# Patient Record
Sex: MALE | Race: WHITE | NOT HISPANIC OR LATINO | Employment: FULL TIME | URBAN - METROPOLITAN AREA
[De-identification: names, ages, dates, MRNs, and addresses within clinical notes are randomized per-mention and may not be internally consistent; named-entity substitution may affect disease eponyms.]

---

## 2017-04-07 ENCOUNTER — ALLSCRIPTS OFFICE VISIT (OUTPATIENT)
Dept: OTHER | Facility: OTHER | Age: 27
End: 2017-04-07

## 2017-09-12 ENCOUNTER — ALLSCRIPTS OFFICE VISIT (OUTPATIENT)
Dept: OTHER | Facility: OTHER | Age: 27
End: 2017-09-12

## 2017-09-12 LAB — S PYO AG THROAT QL: NEGATIVE

## 2018-01-09 NOTE — MISCELLANEOUS
Message  Return to work or school:   Lyn Ribeiro is under my professional care   He was seen in my office on 1/26/16   He is able to return to work on  1/27/16            Signatures   Electronically signed by : KALI Torres ; Jan 26 2016  4:14PM EST                       (Author)

## 2018-01-10 NOTE — PROGRESS NOTES
Assessment    1  Well adult exam (V70 0) (Z00 00)   2  Attention and concentration deficit (799 51) (R41 840)   3  Asthma (493 90) (J45 909)   4  Folliculitis (398 0) (T91 7)   5  Congenital nystagmus (379 51) (H55 01)   6  Obesity (278 00) (E66 9)   7  Need for Tdap vaccination (V06 1) (Z23)    Plan  Asthma    · Ventolin  (90 Base) MCG/ACT Inhalation Aerosol Solution; INHALE 2  PUFFS BY MOUTH EVERY 4 HOURS AS NEEDED  Attention and concentration deficit    · *1 - [facility]  *  Status: Active   Requested for: 07Apr2017  Health Referral Questions : Patient requires Psychiatry assessment/intake      appointment (with MD/DO)  Care Summary provided  : Yes  Need for Tdap vaccination    · Adacel 5-2-15 5 LF-MCG/0 5 Intramuscular Suspension  Well adult exam    · Call [phone] if: You have any warning signs of skin cancer ; Status:Complete;    Done: 68BRE1967   · Always use a seat belt and shoulder strap when riding or driving a motor vehicle ;  Status:Complete;   Done: 50XIU1826   · Begin or continue regular aerobic exercise   Gradually work up to at least 3 sessions of 30  minutes of exercise a week ; Status:Complete;   Done: 11NMZ7093   · Brush your teeth 3 times a day and floss at least once a day ; Status:Complete;   Done:  38QWN3265   · Decreasing the stress in your life may help your condition improve ; Status:Complete;    Done: 07Apr2017   · Eat a normal well-balanced diet ; Status:Complete;   Done: 98AJL3773   · Limit your use of alcohol to 2 drinks or cans of beer a day ; Status:Complete;   Done:  11RFP3501   · There are many ways to reduce your risk of catching or spreading a sexually transmitted  Infection ; Status:Complete;   Done: 19BTG7604   · Use a sun block product with an SPF of 15 or more ; Status:Complete;   Done:  07Apr2017   · We recommend routine visits to a dentist ; Status:Complete;   Done: 17TKU4989   · We recommend that you bring your body mass index down to 26 ; Status:Complete;    Done: 07Apr2017   · We recommend that you follow the "Mediterranean diet "; Status:Complete;   Done:  07Apr2017   · We recommend that you follow these rules for gun safety ; Status:Complete;   Done:  07Apr2017    Discussion/Summary  Impression: health maintenance visit  Currently, he eats an adequate diet and has an adequate exercise regimen  Prostate cancer screening: PSA is not indicated  Testicular cancer screening: self testicular exam technique was taught, monthly self testicular exam was advised and clinical testicular exam was done today  Colorectal cancer screening: colorectal cancer screening is not indicated  The immunizations are needed  Advice and education were given regarding nutrition, aerobic exercise and weight loss  Preventative + obesity: Continued weight loss measures (diet + regular exercise) encouraged  Short term goal is to get weight below 200 lbs  Tdap given  UTD with eye exam (congenital nystagmus; sees specialist in [place]; no recent changes; OK to drive)  Work PE form completed  Asthma (mild, intermittent): Refill given for rescue inhaler  Call for worsening  Attention/concentration deficit: Psych referral, per patient request, for further evaluation + discussion of treatment options  Pseudofolliculitis barbae: Handout given--discussion of various treatment measures  Avoid over shaving  Derm for worsening  RTO 1 year     Chief Complaint  26 year well      History of Present Illness  HM, Adult Male: The patient is being seen for a health maintenance evaluation  The last health maintenance visit was 2 year(s) ago  Social History: He is unmarried  Work status: working full time  The patient has never smoked cigarettes  He reports occasional alcohol use and drinking 1-2 drinks per month  The patient has no concerns about alcohol abuse  He has never used illicit drugs  General Health:  The patient's health since the last visit is described as good  He has regular dental visits  He complains of vision problems  Vision care includes having eye examinations 1 times per year  He denies hearing loss  Lifestyle:  He consumes a diverse and healthy diet  He has weight concerns  He exercises regularly  He does not use tobacco  He consumes alcohol  He reports occasional alcohol use  He typically drinks wine  Alcohol concern: The patient has no concerns about alcohol abuse  He denies drug use  Reproductive health:  the patient is sexually active  birth control is being practiced  Screening: cancer screening reviewed and updated  metabolic screening reviewed and updated  risk screening reviewed and updated  HPI:   Here for routine well exam      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    No acute complaints or issues  Congenital nystagmus  No recent change in vision  No corrective lenses  Sees specialist in [place]  Cleared to drive day/night  No recent flares of asthma  Uses rescue inhaler a couple times week at the most, usually when he's in the city  Denies smoking  No recent allergy symptoms  Erratic work schedule  Problems with focus/concentration as a result  Asking about medication options  Good sleep quality when he gets adequate hours  No apnea  Decent energy level overall  Nontender bumps on back of neck x 1-2 years  Attributes to shaving  Non-changing pink birthmark right side of scalp  No recent changes  Saw dermatologist for this a couple years ago  No interventions needed  Trying to lose weight by eating healthier, more fruits, vegetables  Running, doing weights when he can  Steady girlfriend  Sexually active  Uses protection  No STI's  Occasional social alcohol  No drug use  Tdap 2008  Review of Systems    Constitutional: no fever and not feeling tired  Eyes: as noted in HPI    ENT: no sore throat and no hearing loss     Cardiovascular: no chest pain, no palpitations and no extremity edema  Respiratory: no shortness of breath and no cough  Gastrointestinal: no abdominal pain, no nausea, no vomiting, no constipation, no diarrhea and no blood in stools  Genitourinary: no dysuria  Musculoskeletal: no arthralgias  Integumentary: as noted in HPI  Neurological: no headache and no dizziness  Psychiatric: as noted in HPI and no depression  Endocrine: no muscle weakness  Hematologic/Lymphatic: no swollen glands  Over the past 2 weeks, how often have you been bothered by the following problems? 1 ) Little interest or pleasure in doing things? Not at all    2 ) Feeling down, depressed or hopeless? Not at all  Active Problems    1  Asthma (493 90) (J45 909)   2  Congenital nystagmus (379 51) (H55 01)   3  Folliculitis (405 8) (O89 0)   4  Lipid screening (V77 91) (Z13 220)   5  Obesity (278 00) (E66 9)    Past Medical History    · Attention and concentration deficit (799 51) (R41 840)   · History of allergic rhinitis (V12 69) (Z87 09)   · History of insomnia (V13 89) (D67 623)   · Need for Tdap vaccination (V06 1) (Z23)   · History of Pain, upper back (724 5) (M54 9)   · Well adult exam (V70 0) (Z00 00)    Family History  Mother    · No pertinent family history  Father    · No pertinent family history    Social History    · Denied: History of Alcohol   · Denied: History of Drug Use   · Educational Level - Has High School Diploma   · Marital History - Single   · Never A Smoker    Current Meds   1  Mucinex DM  MG Oral Tablet Extended Release 12 Hour; BY MOUTH EVERY 12   HOURS AS NEEDED; Therapy: 71YUW7567 to (Last Rx:26Jan2016)  Requested for: 26Jan2016 Ordered   2  TraZODone HCl - 50 MG Oral Tablet; TAKE 1 TABLET AT BEDTIME AS NEEDED FOR   SLEEP; Therapy: 92CUE8647 to (Evaluate:67Wle5057)  Requested for: 82RCZ2192; Last   Rx:18Nov2014 Ordered   3   Ventolin  (90 Base) MCG/ACT Inhalation Aerosol Solution; INHALE 2 PUFFS BY   MOUTH EVERY 4 HOURS AS NEEDED; Therapy: 47LPE5040 to (Last Rx:13Jpv9572)  Requested for: 26Ioe5838 Ordered    Allergies    1  No Known Drug Allergies    Vitals   Recorded: 86KBA9312 10:52BK   Systolic 791   Diastolic 84     Physical Exam    Constitutional   General appearance: No acute distress, well appearing and well nourished  Head and Face   Head and face: Normal     Eyes   Conjunctiva and lids: No erythema, swelling or discharge  Pupils and irises: Equal, round, reactive to light  Ophthalmoscopic examination: Normal fundi and optic discs  Ears, Nose, Mouth, and Throat   External inspection of ears and nose: Normal     Otoscopic examination: Tympanic membranes translucent with normal light reflex  Canals patent without erythema  Nasal mucosa, septum, and turbinates: Normal without edema or erythema  Lips, teeth, and gums: Normal, good dentition  Oropharynx: Normal with no erythema, edema, exudate or lesions  Neck   Neck: Supple, symmetric, trachea midline, no masses  Thyroid: Normal, no thyromegaly  Pulmonary   Respiratory effort: No increased work of breathing or signs of respiratory distress  Auscultation of lungs: Clear to auscultation  Cardiovascular   Auscultation of heart: Normal rate and rhythm, normal S1 and S2, no murmurs  Pedal pulses: 2+ bilaterally  Examination of extremities for edema and/or varicosities: Normal     Abdomen   Abdomen: Non-tender, no masses  Liver and spleen: No hepatomegaly or splenomegaly  Genitourinary   Scrotal contents: Normal testes, no masses  Penis: Normal, no lesions  Lymphatic   Palpation of lymph nodes in neck: No lymphadenopathy  Musculoskeletal   Gait and station: Normal     Muscle strength/tone: Normal     Skin   Skin and subcutaneous tissue: Abnormal   Back of neck, just below hairline, with couple, small (<5mm), nontender, skin colored papules  Pink, bumpy, raised, plaque (birthmark) on right frontal scalp  Neurologic   Reflexes: 2+ and symmetric  Psychiatric   Orientation to person, place and time: Normal     Mood and affect: Normal        Signatures   Electronically signed by : Segundo Ricks; Apr 7 2017  9:58AM EST                       (Author)    Electronically signed by : Ruba Middleton DO;  Apr 7 2017 10:53AM EST                       (Author)

## 2018-01-13 VITALS — DIASTOLIC BLOOD PRESSURE: 84 MMHG | SYSTOLIC BLOOD PRESSURE: 126 MMHG

## 2018-01-13 VITALS
SYSTOLIC BLOOD PRESSURE: 132 MMHG | TEMPERATURE: 99.2 F | HEART RATE: 89 BPM | OXYGEN SATURATION: 98 % | DIASTOLIC BLOOD PRESSURE: 72 MMHG | WEIGHT: 224 LBS

## 2018-03-28 ENCOUNTER — HOSPITAL ENCOUNTER (EMERGENCY)
Facility: HOSPITAL | Age: 28
Discharge: HOME | End: 2018-03-28
Admitting: EMERGENCY MEDICINE
Payer: COMMERCIAL

## 2018-03-28 ENCOUNTER — APPOINTMENT (EMERGENCY)
Dept: RADIOLOGY | Facility: HOSPITAL | Age: 28
End: 2018-03-28
Payer: COMMERCIAL

## 2018-03-28 VITALS
WEIGHT: 236 LBS | BODY MASS INDEX: 35.77 KG/M2 | HEIGHT: 68 IN | SYSTOLIC BLOOD PRESSURE: 129 MMHG | HEART RATE: 90 BPM | DIASTOLIC BLOOD PRESSURE: 61 MMHG | TEMPERATURE: 97.7 F | OXYGEN SATURATION: 98 % | RESPIRATION RATE: 18 BRPM
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
## 2018-03-28 DIAGNOSIS — S46.919A SHOULDER STRAIN: ICD-10-CM

## 2018-03-28 PROCEDURE — 73030 X-RAY EXAM OF SHOULDER: CPT

## 2018-03-28 PROCEDURE — 73560 X-RAY EXAM OF KNEE 1 OR 2: CPT

## 2018-03-28 PROCEDURE — 99284 EMERGENCY DEPT VISIT MOD MDM: CPT

## 2018-03-28 RX ORDER — NAPROXEN 500 MG/1
500 TABLET ORAL 2 TIMES DAILY WITH MEALS
Qty: 30 TABLET | Refills: 0 | Status: SHIPPED | OUTPATIENT
Start: 2018-03-28 | End: 2018-11-26

## 2018-03-28 RX ORDER — ACETAMINOPHEN 325 MG/1
975 TABLET ORAL ONCE
Status: COMPLETED | OUTPATIENT
Start: 2018-03-28 | End: 2018-03-28

## 2018-03-28 RX ORDER — IBUPROFEN 400 MG/1
800 TABLET ORAL ONCE
Status: COMPLETED | OUTPATIENT
Start: 2018-03-28 | End: 2018-03-28

## 2018-03-28 RX ADMIN — IBUPROFEN 800 MG: 400 TABLET, FILM COATED ORAL at 14:34

## 2018-03-28 RX ADMIN — ACETAMINOPHEN 975 MG: 325 TABLET, FILM COATED ORAL at 14:34
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
## 2018-03-28 NOTE — DISCHARGE INSTRUCTIONS
Muscle Strain   WHAT YOU NEED TO KNOW:   A muscle strain is a twist, pull, or tear of a muscle or tendon  A tendon is a strong elastic tissue that connects a muscle to a bone  Signs of a strained muscle include bruising and swelling over the area, pain with movement, and loss of strength  DISCHARGE INSTRUCTIONS:   Return to the emergency department if:   · You suddenly cannot feel or move your injured muscle  Contact your healthcare provider if:   · Your pain and swelling worsen or do not go away  · You have questions or concerns about your condition or care  Medicines:   · NSAIDs  help decrease swelling and pain or fever  This medicine is available with or without a doctor's order  NSAIDs can cause stomach bleeding or kidney problems in certain people  If you take blood thinner medicine, always ask your healthcare provider if NSAIDs are safe for you  Always read the medicine label and follow directions  · Muscle relaxers  help decrease pain and muscle spasms  · Take your medicine as directed  Contact your healthcare provider if you think your medicine is not helping or if you have side effects  Tell him of her if you are allergic to any medicine  Keep a list of the medicines, vitamins, and herbs you take  Include the amounts, and when and why you take them  Bring the list or the pill bottles to follow-up visits  Carry your medicine list with you in case of an emergency  Follow up with your healthcare provider as directed: Your healthcare provider may suggest that you have a follow-up visit before you go back to your usual activity  Write down your questions so you remember to ask them during your visits  Self-care:   · 3 to 7 days after the injury:  Use Rest, Ice, Compression, and Elevation (RICE) to help stop bruising and decrease pain and swelling  ¨ Rest:  Rest your muscle to allow your injury to heal  When the pain decreases, begin normal, slow movements   For mild and moderate muscle strains, you should rest your muscles for about 2 days  However, if you have a severe muscle strain, you should rest for 10 to 14 days  You may need to use crutches to walk if your muscle strain is in your legs or lower body  ¨ Ice:  Put an ice pack on the injured area  Put a towel between the ice pack and your skin  Do not put the ice pack directly on your skin  You can use a package of frozen peas instead of an ice pack  ¨ Compression:  You may need to wrap an elastic bandage around the area to decrease swelling  It should be tight enough for you to feel support  Do not wrap it too tightly  ¨ Elevation:  Keep the injured muscle raised above your heart if possible  For example if you have a strain of your lower leg muscle, lie down and prop your leg up on pillows  This helps decrease pain and swelling  · 3 to 21 days after the injury:  Start to slowly and regularly exercise your muscle  This will help it heal  If you feel pain, decrease how hard you are exercising  · 1 to 6 weeks after the injury:  Stretch the injured muscle  Hold the stretch for about 30 seconds  Do this 4 times a day  You may stretch the muscle until you feel a slight pull  Stop stretching if you feel pain  · 2 weeks to 6 months after the injury:  The goal of this phase is to return to the activity you were doing before the injury happened, without hurting the muscle again  · 3 weeks to 6 months after the injury:  Keep stretching and strengthening your muscles to avoid injury  Slowly increase the time and distance that you exercise  You may have signs and symptoms of muscle strain 6 months after the injury, even if you do things to help it heal  In this case, you may need surgery on the muscle  © 2017 [employer] Information is for End User's use only and may not be sold, redistributed or otherwise used for commercial purposes   All illustrations and images included in CareNotes® are the copyrighted property of [employer]  or Beni Mcmanus  The above information is an  only  It is not intended as medical advice for individual conditions or treatments  Talk to your doctor, nurse or pharmacist before following any medical regimen to see if it is safe and effective for you

## 2018-04-05 ENCOUNTER — TELEPHONE (OUTPATIENT)
Dept: FAMILY MEDICINE CLINIC | Facility: OTHER | Age: 28
End: 2018-04-05

## 2018-04-12 ENCOUNTER — TELEPHONE (OUTPATIENT)
Dept: FAMILY MEDICINE CLINIC | Facility: OTHER | Age: 28
End: 2018-04-12

## 2018-04-12 DIAGNOSIS — M79.603 PAIN OF UPPER EXTREMITY, UNSPECIFIED LATERALITY: Primary | ICD-10-CM

## 2018-04-12 NOTE — TELEPHONE ENCOUNTER
According to the ER report, he had pain in his SHOULDER and they performed a shoulder x-ray which was negative  No mention of anything with the FOREARM  If he is having ongoing issues, he should see an orthopedic specialist (order placed)  If they can't see him right away, and/or his arm gets worse, he should be seen either by us or a repeat visit to urgent care/ER    Thanks

## 2018-11-26 ENCOUNTER — OFFICE VISIT (OUTPATIENT)
Dept: FAMILY MEDICINE CLINIC | Facility: OTHER | Age: 28
End: 2018-11-26
Payer: COMMERCIAL

## 2018-11-26 VITALS
SYSTOLIC BLOOD PRESSURE: 124 MMHG | DIASTOLIC BLOOD PRESSURE: 84 MMHG | BODY MASS INDEX: 35.3 KG/M2 | HEART RATE: 99 BPM | TEMPERATURE: 97 F | WEIGHT: 224.9 LBS | HEIGHT: 67 IN | OXYGEN SATURATION: 98 %

## 2018-11-26 DIAGNOSIS — Z13.1 SCREENING FOR DIABETES MELLITUS: ICD-10-CM

## 2018-11-26 DIAGNOSIS — Z23 NEED FOR INFLUENZA VACCINATION: ICD-10-CM

## 2018-11-26 DIAGNOSIS — J45.20 MILD INTERMITTENT ASTHMA WITHOUT COMPLICATION: ICD-10-CM

## 2018-11-26 DIAGNOSIS — R41.840 ATTENTION AND CONCENTRATION DEFICIT: ICD-10-CM

## 2018-11-26 DIAGNOSIS — Z00.00 WELL ADULT EXAM: Primary | ICD-10-CM

## 2018-11-26 DIAGNOSIS — Z13.220 SCREENING FOR HYPERLIPIDEMIA: ICD-10-CM

## 2018-11-26 DIAGNOSIS — E66.9 CLASS 2 OBESITY WITHOUT SERIOUS COMORBIDITY WITH BODY MASS INDEX (BMI) OF 35.0 TO 35.9 IN ADULT, UNSPECIFIED OBESITY TYPE: ICD-10-CM

## 2018-11-26 PROCEDURE — 99395 PREV VISIT EST AGE 18-39: CPT | Performed by: NURSE PRACTITIONER

## 2018-11-26 PROCEDURE — 90471 IMMUNIZATION ADMIN: CPT

## 2018-11-26 PROCEDURE — 90686 IIV4 VACC NO PRSV 0.5 ML IM: CPT

## 2018-11-26 RX ORDER — ALBUTEROL SULFATE 90 UG/1
2 AEROSOL, METERED RESPIRATORY (INHALATION) EVERY 4 HOURS PRN
COMMUNITY
Start: 2013-05-06 | End: 2018-11-26 | Stop reason: SDUPTHER

## 2018-11-26 RX ORDER — ALBUTEROL SULFATE 90 UG/1
2 AEROSOL, METERED RESPIRATORY (INHALATION) EVERY 4 HOURS PRN
Qty: 2 INHALER | Refills: 3 | Status: SHIPPED | OUTPATIENT
Start: 2018-11-26 | End: 2019-03-18 | Stop reason: SDUPTHER

## 2018-11-26 NOTE — PATIENT INSTRUCTIONS
Wellness Visit for Adults   WHAT YOU NEED TO KNOW:   What is a wellness visit? A wellness visit is when you see your healthcare provider to get screened for health problems  You can also get advice on how to stay healthy  Write down your questions so you remember to ask them  Ask your healthcare provider how often you should have a wellness visit  What happens at a wellness visit? Your healthcare provider will ask about your health, and your family history of health problems  This includes high blood pressure, heart disease, and cancer  He or she will ask if you have symptoms that concern you, if you smoke, and about your mood  You may also be asked about your intake of medicines, supplements, food, and alcohol  Any of the following may be done:  · Your weight  will be checked  Your height may also be checked so your body mass index (BMI) can be calculated  Your BMI shows if you are at a healthy weight  · Your blood pressure  and heart rate will be checked  Your temperature may also be checked  · Blood and urine tests  may be done  Blood tests may be done to check your cholesterol levels  Abnormal cholesterol levels increase your risk for heart disease and stroke  You may also need a blood or urine test to check for diabetes if you are at increased risk  Urine tests may be done to look for signs of an infection or kidney disease  · A physical exam  includes checking your heartbeat and lungs with a stethoscope  Your healthcare provider may also check your skin to look for sun damage  · Screening tests  may be recommended  A screening test is done to check for diseases that may not cause symptoms  The screening tests you may need depend on your age, gender, family history, and lifestyle habits  For example, colorectal screening may be recommended if you are 48years old or older  What screening tests do I need if I am a woman? · A Pap smear  is used to screen for cervical cancer   Pap smears are usually done every 3 to 5 years depending on your age  You may need them more often if you have had abnormal Pap smear test results in the past  Ask your healthcare provider how often you should have a Pap smear  · A mammogram  is an x-ray of your breasts to screen for breast cancer  Experts recommend mammograms every 2 years starting at age 48 years  You may need a mammogram at age 52 years or younger if you have an increased risk for breast cancer  Talk to your healthcare provider about when you should start having mammograms and how often you need them  What vaccines might I need? · Get an influenza vaccine  every year  The influenza vaccine protects you from the flu  Several types of viruses cause the flu  The viruses change over time, so new vaccines are made each year  · Get a tetanus-diphtheria (Td) booster vaccine  every 10 years  This vaccine protects you against tetanus and diphtheria  Tetanus is a severe infection that may cause painful muscle spasms and lockjaw  Diphtheria is a severe bacterial infection that causes a thick covering in the back of your mouth and throat  · Get a human papillomavirus (HPV) vaccine  if you are female and aged 23 to 32 or male 23 to 24 and never received it  This vaccine protects you from HPV infection  HPV is the most common infection spread by sexual contact  HPV may also cause vaginal, penile, and anal cancers  · Get a pneumococcal vaccine  if you are aged 72 years or older  The pneumococcal vaccine is an injection given to protect you from pneumococcal disease  Pneumococcal disease is an infection caused by pneumococcal bacteria  The infection may cause pneumonia, meningitis, or an ear infection  · Get a shingles vaccine  if you are aged 61 or older, even if you have had shingles before  The shingles vaccine is an injection to protect you from the varicella-zoster virus  This is the same virus that causes chickenpox   Shingles is a painful rash that develops in people who had chickenpox or have been exposed to the virus  How can I eat healthy? My Plate is a model for planning healthy meals  It shows the types and amounts of foods that should go on your plate  Fruits and vegetables make up about half of your plate, and grains and protein make up the other half  A serving of dairy is included on the side of your plate  The amount of calories and serving sizes you need depends on your age, gender, weight, and height  Examples of healthy foods are listed below:  · Eat a variety of vegetables  such as dark green, red, and orange vegetables  You can also include canned vegetables low in sodium (salt) and frozen vegetables without added butter or sauces  · Eat a variety of fresh fruits , canned fruit in 100% juice, frozen fruit, and dried fruit  · Include whole grains  At least half of the grains you eat should be whole grains  Examples include whole-wheat bread, wheat pasta, brown rice, and whole-grain cereals such as oatmeal     · Eat a variety of protein foods such as seafood (fish and shellfish), lean meat, and poultry without skin (turkey and chicken)  Examples of lean meats include pork leg, shoulder, or tenderloin, and beef round, sirloin, tenderloin, and extra lean ground beef  Other protein foods include eggs and egg substitutes, beans, peas, soy products, nuts, and seeds  · Choose low-fat dairy products such as skim or 1% milk or low-fat yogurt, cheese, and cottage cheese  · Limit unhealthy fats  such as butter, hard margarine, and shortening  How much exercise do I need? Exercise at least 30 minutes per day on most days of the week  Some examples of exercise include walking, biking, dancing, and swimming  You can also fit in more physical activity by taking the stairs instead of the elevator or parking farther away from stores  Include muscle strengthening activities 2 days each week  Regular exercise provides many health benefits  It helps you manage your weight, and decreases your risk for type 2 diabetes, heart disease, stroke, and high blood pressure  Exercise can also help improve your mood  Ask your healthcare provider about the best exercise plan for you  What are some general health and safety guidelines I should follow? · Do not smoke  Nicotine and other chemicals in cigarettes and cigars can cause lung damage  Ask your healthcare provider for information if you currently smoke and need help to quit  E-cigarettes or smokeless tobacco still contain nicotine  Talk to your healthcare provider before you use these products  · Limit alcohol  A drink of alcohol is 12 ounces of beer, 5 ounces of wine, or 1½ ounces of liquor  · Lose weight, if needed  Being overweight increases your risk of certain health conditions  These include heart disease, high blood pressure, type 2 diabetes, and certain types of cancer  · Protect your skin  Do not sunbathe or use tanning beds  Use sunscreen with a SPF 15 or higher  Apply sunscreen at least 15 minutes before you go outside  Reapply sunscreen every 2 hours  Wear protective clothing, hats, and sunglasses when you are outside  · Drive safely  Always wear your seatbelt  Make sure everyone in your car wears a seatbelt  A seatbelt can save your life if you are in an accident  Do not use your cell phone when you are driving  This could distract you and cause an accident  Pull over if you need to make a call or send a text message  · Practice safe sex  Use latex condoms if are sexually active and have more than one partner  Your healthcare provider may recommend screening tests for sexually transmitted infections (STIs)  · Wear helmets, lifejackets, and protective gear  Always wear a helmet when you ride a bike or motorcycle, go skiing, or play sports that could cause a head injury  Wear protective equipment when you play sports   Wear a lifejacket when you are on a boat or doing water sports  CARE AGREEMENT:   You have the right to help plan your care  Learn about your health condition and how it may be treated  Discuss treatment options with your caregivers to decide what care you want to receive  You always have the right to refuse treatment  The above information is an  only  It is not intended as medical advice for individual conditions or treatments  Talk to your doctor, nurse or pharmacist before following any medical regimen to see if it is safe and effective for you  © 2017 [address] [employer] Information is for End User's use only and may not be sold, redistributed or otherwise used for commercial purposes  All illustrations and images included in CareNotes® are the copyrighted property of A D A M [employer] Inc  or Beni Mcmanus
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
## 2018-12-04 ENCOUNTER — TELEPHONE (OUTPATIENT)
Dept: BEHAVIORAL/MENTAL HEALTH CLINIC | Facility: CLINIC | Age: 28
End: 2018-12-04

## 2018-12-04 NOTE — TELEPHONE ENCOUNTER
Behavorial Health Outpatient Intake Questions    Referred by: Tiff Sorensen with provider before scheduling    Are there any developmental disabilities? No    Does the patient have hearing impairment? No    Does the patient have ICM or CTT? No    Taking injectable psychiatric medications? NoIf yes, patient can not be seen here  Has the patient ever seen or currently see a psychiatrist? No If yes who/when? Has the patient ever seen or currently see a therapist? No If yes who/when? SCHEDULED 12/11/18 WITH Tara Ledesma AT PCP OFFICE    How many visits did the pt have for previous psychiatric treatment?  History    Has the patient served in the [place] [id number]? No    If yes, have you had combat services? No    Was the patient activated into federal active duty as a member of the national guard or reserve? No    Minor Child    Who has custody of the child? Is there a custody agreement? If there is a custody agreement remind parent that they must bring a copy to the first appt or they will not be seen  [facility]     Presenting Problem (in patient's words) ADHD,RELATIONSHIP PROBLEMS,BAD EXPERIENCE    Substance Abuse:No concerns of substance abuse are reported  Has the patient been seen here previously, either inpatient or outpatient? No outpatient    If seen as outpatient, what provider(s) did the patient see? N/A    A member of the patient's family has been in therapy here with NO    ACCEPTED as a patient Yes Appointment Date: 1/2/19 @ 10:00AM  DR JAKE RUBY    Referred Elsewhere?  No    Primary Care Physician: Tony Rico, DO    PCP telephone number: [phone]    SUB: NARAYAN  INS: Jennifer Hunter  ID: [id number]

## 2018-12-11 ENCOUNTER — OFFICE VISIT (OUTPATIENT)
Dept: BEHAVIORAL/MENTAL HEALTH CLINIC | Facility: CLINIC | Age: 28
End: 2018-12-11
Payer: COMMERCIAL

## 2018-12-11 DIAGNOSIS — F43.23 ADJUSTMENT DISORDER WITH MIXED ANXIETY AND DEPRESSED MOOD: Primary | ICD-10-CM

## 2018-12-11 PROCEDURE — 90834 PSYTX W PT 45 MINUTES: CPT | Performed by: SOCIAL WORKER
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
## 2019-01-02 ENCOUNTER — OFFICE VISIT (OUTPATIENT)
Dept: PSYCHIATRY | Facility: CLINIC | Age: 29
End: 2019-01-02
Payer: COMMERCIAL

## 2019-01-02 DIAGNOSIS — R41.840 ATTENTION AND CONCENTRATION DEFICIT: ICD-10-CM

## 2019-01-02 DIAGNOSIS — F32.1 CURRENT MODERATE EPISODE OF MAJOR DEPRESSIVE DISORDER WITHOUT PRIOR EPISODE (HCC): Primary | ICD-10-CM

## 2019-01-02 PROBLEM — Z72.821 INADEQUATE SLEEP HYGIENE: Status: ACTIVE | Noted: 2019-01-02

## 2019-01-02 PROCEDURE — 90792 PSYCH DIAG EVAL W/MED SRVCS: CPT | Performed by: PSYCHIATRY & NEUROLOGY

## 2019-01-02 RX ORDER — BUPROPION HYDROCHLORIDE 150 MG/1
150 TABLET ORAL EVERY MORNING
Qty: 30 TABLET | Refills: 0 | Status: SHIPPED | OUTPATIENT
Start: 2019-01-02 | End: 2019-01-25
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
## 2019-01-08 ENCOUNTER — OFFICE VISIT (OUTPATIENT)
Dept: BEHAVIORAL/MENTAL HEALTH CLINIC | Facility: CLINIC | Age: 29
End: 2019-01-08
Payer: COMMERCIAL

## 2019-01-08 DIAGNOSIS — F32.1 CURRENT MODERATE EPISODE OF MAJOR DEPRESSIVE DISORDER WITHOUT PRIOR EPISODE (HCC): Primary | ICD-10-CM

## 2019-01-08 PROCEDURE — 90834 PSYTX W PT 45 MINUTES: CPT | Performed by: SOCIAL WORKER

## 2019-01-08 NOTE — PSYCH
Assessment/Plan:      Diagnoses and all orders for this visit:    Current moderate episode of major depressive disorder without prior episode (HCC)          Subjective:     Patient ID: Matt Dubois  is a 34 y o  male  Pt went to psych - given Wellbutrin - started about week and half ago - didn't feel that appt was helpful   Wanted ADHD meds and not other meds  Discussed lack of focus - discussed focus on  job - positive - does walk out of jobs at times at work  Goes to bed very late at night, work a few hours then take naps  Job decision making - money discussed  With Mayela Covarrubias, woman with toddler has own place, PFA against her ex, going to school for psychology - "getting her life together - counselor/meds "    Took gf's ADHD meds and found helpful -  advised not to take other people's meds  Tried more healthy pattern of sleep - wakes up over thinking - "not sleeping - nyquil, melatonin tried "  Dninks soda and coffee in am - advised to stop caffeine in afternoon  Smoking marijuana at times  Job changes often - not "listening to anyone for ten bucks and hour "  "don't like people ordering me around "   Discussed multiple helpful decisions/choices pt could make but patient stated he wouldn't engage in positive decisions making related to employment/finances  He "just wanted to get med for focus  They work  It's not depression "  Explained role of therapy and provided pt with local psychiatrists that may re evaluate for ADHD which he will follow up on      Advised to call office if he would like to work on therapeutic skills/improved insight/decisions making/coping    HPI    Review of Systems      Objective:     Physical Exam  oriented, poor eye contact, calm but expressing frustration, constricted affect, poor insight/judgement, denies suicidal/homicidal ideations

## 2019-01-09 NOTE — PATIENT INSTRUCTIONS
Patient will call referral resources for new psychiatrist but keep appt with [facility] psychiatrist for next month - continue to take Wellbutrin  PT will call office if he desires counseling/to make behavioral changes

## 2019-01-22 ENCOUNTER — TELEPHONE (OUTPATIENT)
Dept: FAMILY MEDICINE CLINIC | Facility: OTHER | Age: 29
End: 2019-01-22

## 2019-01-22 DIAGNOSIS — J30.9 ALLERGIC RHINITIS, UNSPECIFIED SEASONALITY, UNSPECIFIED TRIGGER: Primary | ICD-10-CM

## 2019-01-22 RX ORDER — FLUTICASONE PROPIONATE 50 MCG
2 SPRAY, SUSPENSION (ML) NASAL DAILY
Qty: 1 BOTTLE | Refills: 3 | Status: SHIPPED | OUTPATIENT
Start: 2019-01-22 | End: 2019-05-16

## 2019-01-22 NOTE — TELEPHONE ENCOUNTER
Patient is asking if you can send in a prescription for Flonase to his pharmacy?  I do not see this medication on his current med list, however, he states that he used it in the past

## 2019-01-25 ENCOUNTER — OFFICE VISIT (OUTPATIENT)
Dept: FAMILY MEDICINE CLINIC | Facility: OTHER | Age: 29
End: 2019-01-25
Payer: COMMERCIAL

## 2019-01-25 VITALS
WEIGHT: 231.13 LBS | DIASTOLIC BLOOD PRESSURE: 80 MMHG | HEART RATE: 104 BPM | BODY MASS INDEX: 36.28 KG/M2 | SYSTOLIC BLOOD PRESSURE: 128 MMHG | OXYGEN SATURATION: 98 % | TEMPERATURE: 97.2 F | HEIGHT: 67 IN

## 2019-01-25 DIAGNOSIS — M79.603 PAIN OF UPPER EXTREMITY, UNSPECIFIED LATERALITY: ICD-10-CM

## 2019-01-25 DIAGNOSIS — M25.511 CHRONIC RIGHT SHOULDER PAIN: ICD-10-CM

## 2019-01-25 DIAGNOSIS — J06.9 VIRAL UPPER RESPIRATORY TRACT INFECTION: Primary | ICD-10-CM

## 2019-01-25 DIAGNOSIS — G89.29 CHRONIC RIGHT SHOULDER PAIN: ICD-10-CM

## 2019-01-25 PROCEDURE — 99214 OFFICE O/P EST MOD 30 MIN: CPT | Performed by: NURSE PRACTITIONER

## 2019-01-25 PROCEDURE — 3008F BODY MASS INDEX DOCD: CPT | Performed by: NURSE PRACTITIONER

## 2019-01-25 RX ORDER — PROMETHAZINE HYDROCHLORIDE AND CODEINE PHOSPHATE 6.25; 1 MG/5ML; MG/5ML
5 SYRUP ORAL EVERY 4 HOURS PRN
Qty: 180 ML | Refills: 0 | Status: SHIPPED | OUTPATIENT
Start: 2019-01-25 | End: 2019-05-16 | Stop reason: SDUPTHER
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
## 2019-01-25 NOTE — PATIENT INSTRUCTIONS
Upper Respiratory Infection, Ambulatory Care   GENERAL INFORMATION:   An upper respiratory infection  is also called a common cold  It can affect your nose, throat, ears, and sinuses  Common symptoms include the following:   · Runny or stuffy nose    · Sneezing and coughing    · Sore throat or hoarseness    · Red, watery, and sore eyes    · Tiredness or restlessness    · Chills and fever    · Headache, body aches, or sore muscles  Seek immediate care for the following symptoms:   · Headaches or a stiff neck    · Bright lights hurt your eyes    · Chest pain or trouble breathing  Treatment for an upper respiratory infection  may include any of the following:  · Decongestants  help decrease nasal congestion and improve your breathing  Do not use decongestant sprays for more than a few days  · Cough suppressants  help decrease coughing  Ask your healthcare provider which type of cough medicine is best for you  Some cough medicines need a doctor's order  · NSAIDs  help decrease swelling and pain or fever  This medicine is available with or without a doctor's order  NSAIDs can cause stomach bleeding or kidney problems in certain people  If you take blood thinner medicine, always ask your healthcare provider if NSAIDs are safe for you  Always read the medicine label and follow directions  Care for an upper respiratory infection:   · Rest  until your fever is gone or you feel better  · Drink liquids as directed to prevent dehydration  You may need to drink 8 to 10 cups of liquid each day  Good liquids to drink include water, ginger ale, tea, or fruit juices  · Gargle  with warm salt water to help your sore throat feel better  Mix ¼ teaspoon salt with 1 cup warm water  You may also suck on hard candy or throat lozenges  · Saline nasal drops  help loosen your nasal congestion  They can be bought without a doctor's order      · Take a warm bath or shower  to help decrease body aches and help you breathe easier  · Use a cool-mist humidifier  to increase air moisture and make it easier for you to breathe  Prevent the spread of germs:   · Avoid others for the first 2 to 3 days of your cold  Germs are easily spread during this time  · Do not share food, drinks,  towels, or personal items with others  · Wash your hands often  Use soap and water  Wash your hands after you use the bathroom, change a child's diapers, or sneeze  Wash your hands before you prepare or eat food  Cover your mouth and nose with a tissue when you sneeze or cough  Follow up with your healthcare provider as directed:  Write down your questions so you remember to ask them during your visits  CARE AGREEMENT:   You have the right to help plan your care  Learn about your health condition and how it may be treated  Discuss treatment options with your caregivers to decide what care you want to receive  You always have the right to refuse treatment  The above information is an  only  It is not intended as medical advice for individual conditions or treatments  Talk to your doctor, nurse or pharmacist before following any medical regimen to see if it is safe and effective for you  © 2014 [address] is for End User's use only and may not be sold, redistributed or otherwise used for commercial purposes  All illustrations and images included in CareNotes® are the copyrighted property of [employer]  or Beni Mcmanus

## 2019-01-31 ENCOUNTER — DOCUMENTATION (OUTPATIENT)
Dept: PSYCHIATRY | Facility: CLINIC | Age: 29
End: 2019-01-31

## 2019-03-18 DIAGNOSIS — J45.20 MILD INTERMITTENT ASTHMA WITHOUT COMPLICATION: ICD-10-CM

## 2019-03-18 RX ORDER — ALBUTEROL SULFATE 90 UG/1
2 AEROSOL, METERED RESPIRATORY (INHALATION) EVERY 4 HOURS PRN
Qty: 2 INHALER | Refills: 3 | Status: SHIPPED | OUTPATIENT
Start: 2019-03-18 | End: 2019-05-16 | Stop reason: SDUPTHER

## 2019-05-16 ENCOUNTER — OFFICE VISIT (OUTPATIENT)
Dept: FAMILY MEDICINE CLINIC | Facility: OTHER | Age: 29
End: 2019-05-16
Payer: COMMERCIAL

## 2019-05-16 ENCOUNTER — TELEPHONE (OUTPATIENT)
Dept: FAMILY MEDICINE CLINIC | Facility: OTHER | Age: 29
End: 2019-05-16

## 2019-05-16 VITALS
HEART RATE: 74 BPM | WEIGHT: 233 LBS | DIASTOLIC BLOOD PRESSURE: 80 MMHG | OXYGEN SATURATION: 98 % | SYSTOLIC BLOOD PRESSURE: 122 MMHG | HEIGHT: 67 IN | BODY MASS INDEX: 36.57 KG/M2 | TEMPERATURE: 97.1 F

## 2019-05-16 DIAGNOSIS — J45.20 MILD INTERMITTENT ASTHMA WITHOUT COMPLICATION: ICD-10-CM

## 2019-05-16 DIAGNOSIS — J30.9 ALLERGIC RHINITIS, UNSPECIFIED SEASONALITY, UNSPECIFIED TRIGGER: Primary | ICD-10-CM

## 2019-05-16 DIAGNOSIS — J06.9 VIRAL UPPER RESPIRATORY TRACT INFECTION: ICD-10-CM

## 2019-05-16 PROCEDURE — 99214 OFFICE O/P EST MOD 30 MIN: CPT | Performed by: NURSE PRACTITIONER

## 2019-05-16 PROCEDURE — 3008F BODY MASS INDEX DOCD: CPT | Performed by: NURSE PRACTITIONER

## 2019-05-16 RX ORDER — ALBUTEROL SULFATE 90 UG/1
2 AEROSOL, METERED RESPIRATORY (INHALATION) EVERY 4 HOURS PRN
Qty: 2 INHALER | Refills: 5 | Status: SHIPPED | OUTPATIENT
Start: 2019-05-16 | End: 2019-09-17 | Stop reason: SDUPTHER

## 2019-05-16 RX ORDER — FLUTICASONE PROPIONATE 50 MCG
2 SPRAY, SUSPENSION (ML) NASAL DAILY
Qty: 1 BOTTLE | Refills: 3 | Status: SHIPPED | OUTPATIENT
Start: 2019-05-16 | End: 2020-04-15 | Stop reason: SDUPTHER

## 2019-05-16 RX ORDER — PROMETHAZINE HYDROCHLORIDE AND CODEINE PHOSPHATE 6.25; 1 MG/5ML; MG/5ML
5 SYRUP ORAL EVERY 4 HOURS PRN
Qty: 240 ML | Refills: 0 | Status: SHIPPED | OUTPATIENT
Start: 2019-05-16 | End: 2019-09-17 | Stop reason: SDUPTHER

## 2019-05-16 RX ORDER — LORATADINE 10 MG/1
10 TABLET ORAL DAILY
Qty: 30 TABLET | Refills: 5 | Status: SHIPPED | OUTPATIENT
Start: 2019-05-16 | End: 2020-04-15 | Stop reason: SDUPTHER

## 2019-09-17 ENCOUNTER — OFFICE VISIT (OUTPATIENT)
Dept: FAMILY MEDICINE CLINIC | Facility: OTHER | Age: 29
End: 2019-09-17
Payer: COMMERCIAL

## 2019-09-17 ENCOUNTER — TELEPHONE (OUTPATIENT)
Dept: FAMILY MEDICINE CLINIC | Facility: OTHER | Age: 29
End: 2019-09-17

## 2019-09-17 VITALS
BODY MASS INDEX: 32.88 KG/M2 | OXYGEN SATURATION: 99 % | WEIGHT: 209.5 LBS | SYSTOLIC BLOOD PRESSURE: 110 MMHG | HEART RATE: 86 BPM | DIASTOLIC BLOOD PRESSURE: 72 MMHG | HEIGHT: 67 IN | TEMPERATURE: 98.5 F

## 2019-09-17 DIAGNOSIS — Z20.2 POSSIBLE EXPOSURE TO STD: ICD-10-CM

## 2019-09-17 DIAGNOSIS — H60.502 ACUTE OTITIS EXTERNA OF LEFT EAR, UNSPECIFIED TYPE: Primary | ICD-10-CM

## 2019-09-17 DIAGNOSIS — Z13.1 SCREENING FOR DIABETES MELLITUS: ICD-10-CM

## 2019-09-17 DIAGNOSIS — J45.20 MILD INTERMITTENT ASTHMA WITHOUT COMPLICATION: ICD-10-CM

## 2019-09-17 DIAGNOSIS — Z13.220 SCREENING FOR HYPERLIPIDEMIA: ICD-10-CM

## 2019-09-17 DIAGNOSIS — J06.9 VIRAL UPPER RESPIRATORY TRACT INFECTION: ICD-10-CM

## 2019-09-17 PROCEDURE — 87591 N.GONORRHOEAE DNA AMP PROB: CPT | Performed by: NURSE PRACTITIONER

## 2019-09-17 PROCEDURE — 99214 OFFICE O/P EST MOD 30 MIN: CPT | Performed by: NURSE PRACTITIONER

## 2019-09-17 PROCEDURE — 87491 CHLMYD TRACH DNA AMP PROBE: CPT | Performed by: NURSE PRACTITIONER

## 2019-09-17 PROCEDURE — 3008F BODY MASS INDEX DOCD: CPT | Performed by: NURSE PRACTITIONER

## 2019-09-17 RX ORDER — ALBUTEROL SULFATE 90 UG/1
2 AEROSOL, METERED RESPIRATORY (INHALATION) EVERY 4 HOURS PRN
Qty: 2 INHALER | Refills: 5 | Status: SHIPPED | OUTPATIENT
Start: 2019-09-17 | End: 2019-12-20 | Stop reason: SDUPTHER

## 2019-09-17 RX ORDER — PROMETHAZINE HYDROCHLORIDE AND CODEINE PHOSPHATE 6.25; 1 MG/5ML; MG/5ML
5 SYRUP ORAL EVERY 4 HOURS PRN
Qty: 240 ML | Refills: 0 | Status: SHIPPED | OUTPATIENT
Start: 2019-09-17 | End: 2019-12-20

## 2019-09-17 RX ORDER — HYDROCORTISONE AND ACETIC ACID 20.75; 10.375 MG/ML; MG/ML
3 SOLUTION AURICULAR (OTIC) EVERY 6 HOURS SCHEDULED
Qty: 10 ML | Refills: 0 | Status: SHIPPED | OUTPATIENT
Start: 2019-09-17 | End: 2019-12-20

## 2019-09-17 NOTE — PROGRESS NOTES
Assessment/Plan:         Problem List Items Addressed This Visit       Other Visit Diagnoses     Acute otitis externa of left ear   --Ear drops  Keep ear dry  --Call for no resolution/worsening over the next couple days  Relevant Medications   hydrocortisone-acetic acid (VOSOL-HC) otic solution 3 gtts QID x 7 days   Possible exposure to STD  --Counselled regarding standard STI testing  Lab slip given along with previously ordered routine labs (now )        Relevant Orders    Chlamydia/GC amplified DNA by PCR    HIV 1/2 AG-AB combo    RPR    HSV TYPE 1,2 DNA PCR    Screening for hyperlipidemia        Relevant Orders    Lipid Panel with Direct LDL reflex    TSH, 3rd generation with Free T4 reflex    Screening for diabetes mellitus        Relevant Orders    CBC and differential    Comprehensive metabolic panel    Hemoglobin A1C            Subjective:      Patient ID: Raheel Fraser  is a 34 y o  male  Here because he would like to get STI testing performed  Lacks symptoms including fevers, dysuria, urethral discharge, genital ulcers/sores, but girlfriend recently told him that she was diagnosed and treated for chlamydia  Also notes mild left ear discomfort, drainage, "feels like velcro" x 1 week  Was recently swimming at indoor water park in the [address] home treatments  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  Currently working as   The following portions of the patient's history were reviewed and updated as appropriate: current medications, past family history, past medical history, past social history, past surgical history and problem list     Review of Systems   Constitutional: Negative for fever  HENT: Positive for ear discharge and ear pain  Respiratory: Positive for cough  Genitourinary: Negative for discharge, dysuria, genital sores, penile pain, testicular pain and urgency           Objective:      /72 (BP Location: Left arm, Patient Position: Sitting, Cuff Size: Large)   Pulse (!) 122   Temp 98 5 °F (36 9 °C) (Tympanic)   Ht 5' 7" (1 702 m)   Wt 95 kg (209 lb 8 oz)   SpO2 99%   BMI 32 81 kg/m²          Physical Exam   Constitutional: He is oriented to person, place, and time  He appears well-developed  HENT:   Right Ear: External ear normal    Left canal mildly erythematous, swollen, without otorrhea  TM wnl  Small amount of cerumen noted proximally  Readily removed with curette and brief warm water lavage  Pulmonary/Chest: Effort normal    Neurological: He is alert and oriented to person, place, and time

## 2019-09-19 LAB
C TRACH DNA SPEC QL NAA+PROBE: NEGATIVE
N GONORRHOEA DNA SPEC QL NAA+PROBE: NEGATIVE

## 2019-12-20 ENCOUNTER — OFFICE VISIT (OUTPATIENT)
Dept: FAMILY MEDICINE CLINIC | Facility: CLINIC | Age: 29
End: 2019-12-20
Payer: COMMERCIAL

## 2019-12-20 VITALS
WEIGHT: 214 LBS | RESPIRATION RATE: 16 BRPM | OXYGEN SATURATION: 98 % | BODY MASS INDEX: 33.59 KG/M2 | HEIGHT: 67 IN | SYSTOLIC BLOOD PRESSURE: 130 MMHG | HEART RATE: 106 BPM | DIASTOLIC BLOOD PRESSURE: 80 MMHG

## 2019-12-20 DIAGNOSIS — F43.23 ADJUSTMENT DISORDER WITH MIXED ANXIETY AND DEPRESSED MOOD: ICD-10-CM

## 2019-12-20 DIAGNOSIS — E66.9 CLASS 1 OBESITY WITHOUT SERIOUS COMORBIDITY WITH BODY MASS INDEX (BMI) OF 33.0 TO 33.9 IN ADULT, UNSPECIFIED OBESITY TYPE: ICD-10-CM

## 2019-12-20 DIAGNOSIS — Z76.89 ENCOUNTER TO ESTABLISH CARE: Primary | ICD-10-CM

## 2019-12-20 DIAGNOSIS — G89.29 CHRONIC RIGHT SHOULDER PAIN: ICD-10-CM

## 2019-12-20 DIAGNOSIS — J30.9 ALLERGIC RHINITIS, UNSPECIFIED SEASONALITY, UNSPECIFIED TRIGGER: ICD-10-CM

## 2019-12-20 DIAGNOSIS — J45.20 MILD INTERMITTENT ASTHMA WITHOUT COMPLICATION: ICD-10-CM

## 2019-12-20 DIAGNOSIS — R41.840 ATTENTION AND CONCENTRATION DEFICIT: ICD-10-CM

## 2019-12-20 DIAGNOSIS — M25.511 CHRONIC RIGHT SHOULDER PAIN: ICD-10-CM

## 2019-12-20 DIAGNOSIS — Z11.3 SCREENING FOR STD (SEXUALLY TRANSMITTED DISEASE): ICD-10-CM

## 2019-12-20 DIAGNOSIS — Z13.1 SCREENING FOR DIABETES MELLITUS: ICD-10-CM

## 2019-12-20 DIAGNOSIS — Z23 NEED FOR INFLUENZA VACCINATION: ICD-10-CM

## 2019-12-20 DIAGNOSIS — H55.00 NYSTAGMUS: ICD-10-CM

## 2019-12-20 DIAGNOSIS — Z13.220 SCREENING CHOLESTEROL LEVEL: ICD-10-CM

## 2019-12-20 DIAGNOSIS — Z00.00 ANNUAL PHYSICAL EXAM: ICD-10-CM

## 2019-12-20 DIAGNOSIS — D22.9 MULTIPLE ATYPICAL NEVI: ICD-10-CM

## 2019-12-20 PROCEDURE — 90471 IMMUNIZATION ADMIN: CPT | Performed by: FAMILY MEDICINE

## 2019-12-20 PROCEDURE — 90686 IIV4 VACC NO PRSV 0.5 ML IM: CPT | Performed by: FAMILY MEDICINE

## 2019-12-20 PROCEDURE — 99395 PREV VISIT EST AGE 18-39: CPT | Performed by: FAMILY MEDICINE

## 2019-12-20 RX ORDER — ALBUTEROL SULFATE 90 UG/1
2 AEROSOL, METERED RESPIRATORY (INHALATION) EVERY 4 HOURS PRN
Qty: 1 INHALER | Refills: 2 | Status: SHIPPED | OUTPATIENT
Start: 2019-12-20 | End: 2020-04-15 | Stop reason: SDUPTHER

## 2019-12-20 NOTE — PROGRESS NOTES
Assessment/Plan:   Diagnoses and all orders for this visit:    Encounter to establish care  Annual physical exam  - reviewed PMHx, PSHx, meds, allergies, FHx, Soc Hx and Sexual Hx   - UTD with Tdap and interested in the Flu vaccine today   - discussed diet and exercise  - discussed safe sexual practices and pt interested in STD screening   - overdue for Optho - referral given   - UTD with Dental   - overdue for screening labs - script given   - RTO in 3months for f/u - pt aware and agreeable     Mild intermittent asthma without complication  -     albuterol (VENTOLIN HFA) 90 mcg/act inhaler; Inhale 2 puffs every 4 (four) hours as needed (asthma)  Allergic rhinitis, unspecified seasonality, unspecified trigger  Need for influenza vaccination  -     influenza vaccine, [id number], quadrivalent, 0 5 mL, preservative-free, for adult and pediatric patients 6 mos+ (AFLURIA, FLUARIX, FLULAVAL, FLUZONE)    Attention and concentration deficit  -     Ambulatory referral to Allyn Booker; Future  -     Ambulatory referral to Psychiatry; Future  -     TSH, 3rd generation with Free T4 reflex  Adjustment disorder with mixed anxiety and depressed mood  -     Vitamin D 25 hydroxy; Future  - strongly advised that pt follow up with Psych and a therapist   - pt had a "bad experience" with Psych and Wellbutrin in the past - attributes it to his failing relationship and hesitant to start medications  - referred him to Allan Sheppard (in office interim therapist), [facility] and [facility]  - (+) strong FHx of mental health issues in both parents    Class 1 obesity without serious comorbidity with body mass index (BMI) of 33 0 to 33 9 in adult, unspecified obesity type  -     CBC and differential; Future  - discussed diet and encouraged exercise  - educated that it takes 3500 sharad to lose 1lb  - advised to cut down on carbs, 5 servings of fruits/veggies/day, increase water intake (65-80oz/water/day)   - appropriate weight loss goal for men = 1-2lb/week  - per the Heart Association - 150mins/exercise/week, but to lose and maintain weight 200-300mins/exercise/week   - RTO in 3months for f/u    Nystagmus  -     Ambulatory referral to Ophthalmology; Future    Screening for STD (sexually transmitted disease)  -     HIV 1/2 AG-AB combo; Future  -     Chlamydia/GC amplified DNA by PCR; Future  -     Hepatitis panel, acute; Future  -     RPR; Future    Chronic right shoulder pain  -     Ambulatory referral to Orthopedic Surgery; Future  -     Ambulatory referral to Physical Therapy; Future    Multiple atypical nevi  -     Ambulatory referral to Dermatology; Future    Screening for diabetes mellitus  -     Comprehensive metabolic panel; Future  -     HEMOGLOBIN A1C W/ EAG ESTIMATION; Future    Screening cholesterol level  -     Lipid panel; Future    Other orders  -     Cancel: influenza vaccine, [id number], quadrivalent, 0 5 mL, preservative-free, for adult and pediatric patients 6 mos+ (AFLURIA, FLUARIX, FLULAVAL, FLUZONE)          Subjective:    Patient ID: Sayda Bustamante  is a 34 y o  male  Sayda Bustamante  is a 34 y o  male who presents to the office to establish care and for an annual exam  - prior PCP: Radha Olivo  - Specialists: used to follow with Psych, Optho (Dr Lynn Dandy - in [facility])   - PMHx: asthma (has never been hospitalized for asthma), seasonal allergies (summer), allergic rhinitis, ADHD/Anxiety/Depression (currently not following with Psych or therapist, not on medications), BMI 33 5, nystagmus  - allergies: NKDA  - Meds: Albuterol, Flonase, Claritin  - PSHx: dental surgery   - FHx: M (arthritis, anxiety/depression), F (anxiety/depression/PTSD, prediabetes), PGF (prostate ca - dx at 80), PGM (emphysema - was a smoker), MGF (heart disease), MGM (Alzheimers), denies FHx of HTN/DM/HL/colon ca  - Immunizations:  Tdap 2017, interested in the Flu vaccine today   - diet/exercise: 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; diet: cereal, skips meals   - social: 1/2PPD/4yrs, denies vaping/EtOH/illicts   - sexual Hx: 6 new sexual partners in the past 3months, uses condoms "almost always", interested in STD screening   - last vision: (+) PMHx of nystagmus - wants referral to Optho   - last dental: goes 2x/year   - ROS: today in the office pt denies F/C/N/V/HA/CP/palpitations/SOB/wheezing/abd pain/D/dysuria/hematuria/penile discharge/numbness or tingling in b/l UE+LE/LE edema or calf tenderness        The following portions of the patient's history were reviewed and updated as appropriate: allergies, current medications, past family history, past medical history, past social history, past surgical history and problem list     Review of Systems  as per HPI    Objective:  /80   Pulse (!) 106   Resp 16   Ht 5' 7" (1 702 m)   Wt 97 1 kg (214 lb)   SpO2 98%   BMI 33 52 kg/m²    Physical Exam   Constitutional: He is oriented to person, place, and time  He appears well-developed and well-nourished  No distress  HENT:   Head: Normocephalic and atraumatic  Right Ear: Tympanic membrane, external ear and ear canal normal  No drainage, swelling or tenderness  No middle ear effusion  Left Ear: Tympanic membrane, external ear and ear canal normal  No drainage, swelling or tenderness  No middle ear effusion  Nose: Nose normal    Mouth/Throat: Uvula is midline, oropharynx is clear and moist and mucous membranes are normal  No uvula swelling  No oropharyngeal exudate, posterior oropharyngeal edema, posterior oropharyngeal erythema or tonsillar abscesses  Eyes: Pupils are equal, round, and reactive to light  Conjunctivae and EOM are normal  Right eye exhibits no discharge  Left eye exhibits no discharge  No scleral icterus  Neck: Normal range of motion  Neck supple  Cardiovascular: Normal rate, regular rhythm and normal heart sounds  Exam reveals no gallop and no friction rub  No murmur heard    Pulmonary/Chest: Effort normal and breath sounds normal  No stridor  No respiratory distress  He has no wheezes  He has no rales  Abdominal: Soft  Bowel sounds are normal    Musculoskeletal: Normal range of motion  He exhibits no edema, tenderness or deformity  Neurological: He is alert and oriented to person, place, and time  Skin: Skin is warm  He is not diaphoretic    (+) ingrown hair on chest; multiple nevi or varying shapes and sizes   Psychiatric: He has a normal mood and affect  His behavior is normal  His speech is tangential    Vitals reviewed  BMI Counseling: Body mass index is 33 52 kg/m²  The BMI is above normal  Nutrition recommendations include reducing portion sizes, decreasing overall calorie intake, 3-5 servings of fruits/vegetables daily, reducing fast food intake and consuming healthier snacks  Exercise recommendations include moderate aerobic physical activity for 150 minutes/week and exercising 3-5 times per week

## 2019-12-20 NOTE — PATIENT INSTRUCTIONS

## 2020-04-15 ENCOUNTER — TELEMEDICINE (OUTPATIENT)
Dept: FAMILY MEDICINE CLINIC | Facility: CLINIC | Age: 30
End: 2020-04-15
Payer: COMMERCIAL

## 2020-04-15 DIAGNOSIS — J30.9 ALLERGIC RHINITIS, UNSPECIFIED SEASONALITY, UNSPECIFIED TRIGGER: ICD-10-CM

## 2020-04-15 DIAGNOSIS — R41.840 ATTENTION AND CONCENTRATION DEFICIT: ICD-10-CM

## 2020-04-15 DIAGNOSIS — E66.9 CLASS 1 OBESITY WITHOUT SERIOUS COMORBIDITY WITH BODY MASS INDEX (BMI) OF 33.0 TO 33.9 IN ADULT, UNSPECIFIED OBESITY TYPE: ICD-10-CM

## 2020-04-15 DIAGNOSIS — J45.20 MILD INTERMITTENT ASTHMA WITHOUT COMPLICATION: Primary | ICD-10-CM

## 2020-04-15 DIAGNOSIS — F43.23 ADJUSTMENT DISORDER WITH MIXED ANXIETY AND DEPRESSED MOOD: ICD-10-CM

## 2020-04-15 PROCEDURE — 99214 OFFICE O/P EST MOD 30 MIN: CPT | Performed by: FAMILY MEDICINE

## 2020-04-15 RX ORDER — FLUTICASONE PROPIONATE 50 MCG
2 SPRAY, SUSPENSION (ML) NASAL DAILY
Qty: 1 BOTTLE | Refills: 2 | Status: SHIPPED | OUTPATIENT
Start: 2020-04-15 | End: 2021-10-27 | Stop reason: SDUPTHER

## 2020-04-15 RX ORDER — LORATADINE 10 MG/1
10 TABLET ORAL DAILY
Qty: 30 TABLET | Refills: 5 | Status: SHIPPED | OUTPATIENT
Start: 2020-04-15

## 2020-04-15 RX ORDER — ALBUTEROL SULFATE 90 UG/1
2 AEROSOL, METERED RESPIRATORY (INHALATION) EVERY 4 HOURS PRN
Qty: 1 INHALER | Refills: 2 | Status: SHIPPED | OUTPATIENT
Start: 2020-04-15 | End: 2020-10-05 | Stop reason: SDUPTHER

## 2020-04-23 ENCOUNTER — TELEPHONE (OUTPATIENT)
Dept: PSYCHIATRY | Facility: CLINIC | Age: 30
End: 2020-04-23

## 2020-06-12 ENCOUNTER — TELEPHONE (OUTPATIENT)
Dept: PSYCHIATRY | Facility: CLINIC | Age: 30
End: 2020-06-12

## 2020-06-12 NOTE — TELEPHONE ENCOUNTER
[facility]    Referred by: PCP    Please advised interviewee that they need to answer all questions truthfully to allow for best care and any misrepresentations of information may affect their ability to be seen at this clinic   => Was this discussed? Yes     [facility] -     Presenting Problem (in patient's words): ANXIETY AND DEPRESSION  Are there any developmental disabilities? ? If yes, can they speak to you on the phone? If they are too limited to speak to you on phone, refer out No    Are you taking any psychiatric medications? No    => If yes, who prescribes? If yes, are they injectable medications? Does the patient have a language barrier or hearing impairment? No    Have you been treated at [facility] by a therapist or a doctor in the past? If yes, who? NO    Has the patient been hospitalized for mental health? No   If yes, how long ago was last hospitalization and where was it? Do you actively use alcohol or marijuana or illegal substances? If yes, what and how much - refer out to Drug and alcohol treatment if use is excessive or daily use of illegal substances No concerns of substance abuse are reported  Do you have a community treatment team or ? No    Legal History-     Does the patient have any history of arrests, CHCF/retirement time, or DUIs? No  If Yes-  1) What types of charges? 2) When were they last incarcerated? 3) Are they currently on parole or probation? Minor Child-    Who has custody of the child? Is there a custody agreement? If there is a custody agreement remind parent that they must bring a copy to the first appt or they will not be seen       Intake Team, please check with provider before scheduling if flags come up such as:  - complex case  - legal history (other than DUI)  - communication barrier concerns are present  - if, in your judgment, this needs further review    ACCEPTED as a patient Yes  => Appointment Date: 06/29/2020 w/ Hiwot Miller    Referred Elsewhere? No    Name of Insurance Co: Anayeli Wauseon 8 ID# [id number]  [id number] Phone #  If ins is primary or secondary  If patient is a minor, parents information such as Name, D  O B of guarantor

## 2020-10-05 DIAGNOSIS — J45.20 MILD INTERMITTENT ASTHMA WITHOUT COMPLICATION: ICD-10-CM

## 2020-10-05 RX ORDER — ALBUTEROL SULFATE 90 UG/1
2 AEROSOL, METERED RESPIRATORY (INHALATION) EVERY 6 HOURS PRN
Qty: 1 INHALER | Refills: 0 | Status: SHIPPED | OUTPATIENT
Start: 2020-10-05 | End: 2021-04-07 | Stop reason: SDUPTHER

## 2020-10-30 ENCOUNTER — TELEPHONE (OUTPATIENT)
Dept: FAMILY MEDICINE CLINIC | Facility: OTHER | Age: 30
End: 2020-10-30

## 2021-01-12 NOTE — PATIENT INSTRUCTIONS
1) Get up at the same time seven days out of the week  2) Only go to bed when feeling sleepy  3) Wind down in the evening without electronics  4) Stimulus Control: If lying in bed for 15-20 minutes (estimated because the clock is turned away so you cannot see it) and you are not asleep get up and do something relaxing in a different room (reading a magazine article, solitaire with a deck of cards)  Do this in the middle of the night as well if awake  Avoid doing work or getting on the computer  5) Bedroom for sleep only  No watching TV or using electronics (computer, phone, tablet etc )  in bed  6) Turn clock away so you cannot see it in bed  7) Exercise regularly but try to avoid exercise within 4 hours of bedtime  Morning exercise is best     8) Avoid caffeine in the afternoon  Considering tapering down on caffeine by decreasing by one beverage with caffeine every 3 days until off  9) Avoid smoking near bedtime    10) Avoid alcohol before bed  If you consume one alcoholic beverage allow 3 hours between that drink and bedtime  If you consume two alcoholic beverages allow 5 hours  Between those drinks and bedtime  Alcohol may lead to waking at night  11) Avoid napping except for driving safety  If you feel to sleepy to drive do not drive  If you get sleepy while driving pull over and nap  You may resume driving once you feel alert  12) Read "No More Sleepless Nights" by Manan Dougherty PhD     13) There are some on-line resources that do require a fee that can be of help  Two credible websites are as follows:  http://[employer]/cbt-online-insomnia-treatment html  [employer] si  An xander used by the [place] is as follows:  CBT-I   Go! To Sleep by the [facility]  Wound Care: Petrolatum

## 2021-04-07 DIAGNOSIS — J45.20 MILD INTERMITTENT ASTHMA WITHOUT COMPLICATION: ICD-10-CM

## 2021-04-07 RX ORDER — ALBUTEROL SULFATE 90 UG/1
2 AEROSOL, METERED RESPIRATORY (INHALATION) EVERY 6 HOURS PRN
Qty: 1 INHALER | Refills: 0 | Status: SHIPPED | OUTPATIENT
Start: 2021-04-07 | End: 2021-04-08 | Stop reason: SDUPTHER

## 2021-04-07 NOTE — TELEPHONE ENCOUNTER
Patient is switching to [facility] but is in need of an inhaler  He has asthma  Can you prescribe 1 he is out

## 2021-04-08 ENCOUNTER — TELEMEDICINE (OUTPATIENT)
Dept: FAMILY MEDICINE CLINIC | Facility: OTHER | Age: 31
End: 2021-04-08
Payer: COMMERCIAL

## 2021-04-08 DIAGNOSIS — R05.9 COUGH: Primary | ICD-10-CM

## 2021-04-08 DIAGNOSIS — J45.20 MILD INTERMITTENT ASTHMA WITHOUT COMPLICATION: ICD-10-CM

## 2021-04-08 PROCEDURE — 99214 OFFICE O/P EST MOD 30 MIN: CPT | Performed by: FAMILY MEDICINE

## 2021-04-08 PROCEDURE — 3725F SCREEN DEPRESSION PERFORMED: CPT | Performed by: FAMILY MEDICINE

## 2021-04-08 RX ORDER — AZITHROMYCIN 250 MG/1
TABLET, FILM COATED ORAL
Qty: 6 TABLET | Refills: 0 | Status: SHIPPED | OUTPATIENT
Start: 2021-04-08 | End: 2021-04-12

## 2021-04-08 RX ORDER — PROMETHAZINE HYDROCHLORIDE AND CODEINE PHOSPHATE 6.25; 1 MG/5ML; MG/5ML
5 SYRUP ORAL EVERY 4 HOURS PRN
Qty: 118 ML | Refills: 0 | Status: SHIPPED | OUTPATIENT
Start: 2021-04-08

## 2021-04-08 RX ORDER — ALBUTEROL SULFATE 90 UG/1
2 AEROSOL, METERED RESPIRATORY (INHALATION) EVERY 6 HOURS PRN
Qty: 1 INHALER | Refills: 0 | Status: SHIPPED | OUTPATIENT
Start: 2021-04-08 | End: 2021-08-11 | Stop reason: SDUPTHER

## 2021-04-08 NOTE — PROGRESS NOTES
COVID-19 Outpatient Progress Note    Assessment/Plan:    Problem List Items Addressed This Visit        Respiratory    Asthma    Relevant Medications    albuterol (Ventolin HFA) 90 mcg/act inhaler      Other Visit Diagnoses     Cough    -  Primary    Relevant Medications  Patient states the cough has been strong and comes in clusters mainly at night  He just had a  and it has affected their sleep  He will need strong medication for cough as well  He was told that this is a one time medication that will not be refilled and given to him to take until the inhaler and antibiotic help with his acute symptoms  Patient has a inhaler to use on prn basis for wheezing  FU in 2-3 days    azithromycin (ZITHROMAX) 250 mg tablet    promethazine-codeine (PHENERGAN WITH CODEINE) 6 25-10 mg/5 mL syrup    Other Relevant Orders    Novel Coronavirus (COVID-19), PCR [facility] Collected at [facility] or [facility]      Patient was advised to self isolate  Take the COVID-19 test   FU as recommended or sooner if needed       Disposition:     I referred patient to one of our centralized sites for a COVID-19 swab  I have spent 12 minutes directly with the patient  Greater than 50% of this time was spent in counseling/coordination of care regarding: risks and benefits of treatment options, instructions for management, patient and family education, importance of treatment compliance and risk factor reductions  Encounter provider Mikel Teague MD    Provider located at [address] [id number]    Recent Visits  No visits were found meeting these conditions     Showing recent visits within past 7 days and meeting all other requirements     Today's Visits  Date Type Provider Dept   21 Telemedicine MD Timmy Rao   Showing today's visits and meeting all other requirements     Future Appointments  No visits were found meeting these conditions  Showing future appointments within next 150 days and meeting all other requirements      This virtual check-in was done via [employer] and patient was informed that this is a secure, HIPAA-compliant platform  He agrees to proceed  Patient agrees to participate in a virtual check in via telephone or video visit instead of presenting to the office to address urgent/immediate medical needs  Patient is aware this is a billable service  After connecting through [facility], the patient was identified by name and date of birth  Renato Valencia  was informed that this was a telemedicine visit and that the exam was being conducted confidentially over secure lines  My office door was closed  No one else was in the room  Renato Valencia  acknowledged consent and understanding of privacy and security of the telemedicine visit  I informed the patient that I have reviewed his record in Epic and presented the opportunity for him to ask any questions regarding the visit today  The patient agreed to participate  Subjective:   Renato Valencia  is a 31 y o  male who is concerned about COVID-19  Patient's symptoms include cough and shortness of breath  Patient denies anosmia, loss of taste, abdominal pain, vomiting and diarrhea       Exposure:   Contact with a person who is under investigation (PUI) for or who is positive for COVID-19 within the last 14 days?: No    Hospitalized recently for fever and/or lower respiratory symptoms?: No      Currently a healthcare worker that is involved in direct patient care?: No      Works in a special setting where the risk of COVID-19 transmission may be high? (this may include long-term care, correctional and senior living facilities; homeless shelters; assisted-living facilities and group homes ): No      Resident in a special setting where the risk of COVID-19 transmission may be high? (this may include long-term care, correctional and senior living facilities; homeless shelters; assisted-living facilities and group homes ): No      No results found for: Ivory Reich, [address] [facility], [address] [facility] [place] [address]  Past Medical History:   Diagnosis Date    ADHD (attention deficit hyperactivity disorder)     Allergic     Asthma     Obesity      Past Surgical History:   Procedure Laterality Date    DENTAL SURGERY       Current Outpatient Medications   Medication Sig Dispense Refill    albuterol (Ventolin HFA) 90 mcg/act inhaler Inhale 2 puffs every 6 (six) hours as needed for wheezing or shortness of breath (asthma) 1 Inhaler 0    fluticasone (FLONASE) 50 mcg/act nasal spray 2 sprays into each nostril daily 1 Bottle 2    azithromycin (ZITHROMAX) 250 mg tablet Take 2 tablets today then 1 tablet daily x 4 days 6 tablet 0    loratadine (Claritin) 10 mg tablet Take 1 tablet (10 mg total) by mouth daily (Patient not taking: Reported on 4/8/2021) 30 tablet 5    promethazine-codeine (PHENERGAN WITH CODEINE) 6 25-10 mg/5 mL syrup Take 5 mL by mouth every 4 (four) hours as needed for cough 118 mL 0     No current facility-administered medications for this visit  Allergies   Allergen Reactions    Pollen Extract        Review of Systems   Respiratory: Positive for cough and shortness of breath  Gastrointestinal: Negative for abdominal pain, diarrhea and vomiting  Objective: There were no vitals filed for this visit  Physical Exam  Constitutional:       General: He is not in acute distress  HENT:      Head: Normocephalic and atraumatic  Pulmonary:      Effort: Pulmonary effort is normal  No respiratory distress  Neurological:      General: No focal deficit present  Mental Status: He is alert and oriented to person, place, and time  VIRTUAL VISIT DISCLAIMER    Dieter Haywood  acknowledges that he has consented to an online visit or consultation   He understands that the online visit is based solely on information provided by him, and that, in the absence of a face-to-face physical evaluation by the physician, the diagnosis he receives is both limited and provisional in terms of accuracy and completeness  This is not intended to replace a full medical face-to-face evaluation by the physician  Dieter Haywood  understands and accepts these terms

## 2021-08-11 DIAGNOSIS — J45.20 MILD INTERMITTENT ASTHMA WITHOUT COMPLICATION: ICD-10-CM

## 2021-08-11 RX ORDER — ALBUTEROL SULFATE 90 UG/1
2 AEROSOL, METERED RESPIRATORY (INHALATION) EVERY 6 HOURS PRN
Qty: 1 G | Refills: 0 | Status: SHIPPED | OUTPATIENT
Start: 2021-08-11 | End: 2021-10-27 | Stop reason: SDUPTHER

## 2021-10-27 ENCOUNTER — OFFICE VISIT (OUTPATIENT)
Dept: FAMILY MEDICINE CLINIC | Facility: OTHER | Age: 31
End: 2021-10-27
Payer: COMMERCIAL

## 2021-10-27 VITALS
OXYGEN SATURATION: 98 % | BODY MASS INDEX: 39.78 KG/M2 | RESPIRATION RATE: 18 BRPM | TEMPERATURE: 98 F | SYSTOLIC BLOOD PRESSURE: 114 MMHG | WEIGHT: 254 LBS | HEART RATE: 91 BPM | DIASTOLIC BLOOD PRESSURE: 76 MMHG

## 2021-10-27 DIAGNOSIS — K13.0 MUCOCELE OF LIP: Primary | ICD-10-CM

## 2021-10-27 DIAGNOSIS — Z13.29 THYROID DISORDER SCREEN: ICD-10-CM

## 2021-10-27 DIAGNOSIS — Z11.4 ENCOUNTER FOR SCREENING FOR HIV: ICD-10-CM

## 2021-10-27 DIAGNOSIS — Z13.1 DIABETES MELLITUS SCREENING: ICD-10-CM

## 2021-10-27 DIAGNOSIS — Z11.4 SCREENING FOR HIV (HUMAN IMMUNODEFICIENCY VIRUS): ICD-10-CM

## 2021-10-27 DIAGNOSIS — J45.20 MILD INTERMITTENT ASTHMA WITHOUT COMPLICATION: ICD-10-CM

## 2021-10-27 DIAGNOSIS — J30.9 ALLERGIC RHINITIS, UNSPECIFIED SEASONALITY, UNSPECIFIED TRIGGER: ICD-10-CM

## 2021-10-27 DIAGNOSIS — Z11.59 NEED FOR HEPATITIS C SCREENING TEST: ICD-10-CM

## 2021-10-27 PROCEDURE — 99214 OFFICE O/P EST MOD 30 MIN: CPT | Performed by: FAMILY MEDICINE

## 2021-10-27 RX ORDER — FLUTICASONE PROPIONATE 50 MCG
2 SPRAY, SUSPENSION (ML) NASAL DAILY
Qty: 16 G | Refills: 1 | Status: SHIPPED | OUTPATIENT
Start: 2021-10-27

## 2021-10-27 RX ORDER — EPINEPHRINE 0.3 MG/.3ML
0.3 INJECTION SUBCUTANEOUS ONCE
Qty: 0.6 ML | Refills: 0 | Status: CANCELLED | OUTPATIENT
Start: 2021-10-27 | End: 2021-10-27

## 2021-10-27 RX ORDER — ALBUTEROL SULFATE 90 UG/1
2 AEROSOL, METERED RESPIRATORY (INHALATION) EVERY 6 HOURS PRN
Qty: 1 G | Refills: 5 | Status: SHIPPED | OUTPATIENT
Start: 2021-10-27

## 2023-10-11 ENCOUNTER — OFFICE VISIT (OUTPATIENT)
Dept: URGENT CARE | Facility: CLINIC | Age: 33
End: 2023-10-11
Payer: COMMERCIAL

## 2023-10-11 VITALS
SYSTOLIC BLOOD PRESSURE: 126 MMHG | TEMPERATURE: 98.2 F | HEART RATE: 99 BPM | DIASTOLIC BLOOD PRESSURE: 76 MMHG | OXYGEN SATURATION: 99 % | RESPIRATION RATE: 16 BRPM

## 2023-10-11 DIAGNOSIS — Z87.09 HISTORY OF ASTHMA: ICD-10-CM

## 2023-10-11 DIAGNOSIS — F17.200 CURRENT SMOKER: ICD-10-CM

## 2023-10-11 DIAGNOSIS — B34.9 PHARYNGITIS WITH VIRAL SYNDROME: Primary | ICD-10-CM

## 2023-10-11 DIAGNOSIS — J02.9 PHARYNGITIS WITH VIRAL SYNDROME: Primary | ICD-10-CM

## 2023-10-11 LAB
SARS-COV-2 AG UPPER RESP QL IA: NEGATIVE
VALID CONTROL: NORMAL

## 2023-10-11 PROCEDURE — 99214 OFFICE O/P EST MOD 30 MIN: CPT

## 2023-10-11 PROCEDURE — 87811 SARS-COV-2 COVID19 W/OPTIC: CPT

## 2023-10-11 RX ORDER — ALBUTEROL SULFATE 90 UG/1
2 AEROSOL, METERED RESPIRATORY (INHALATION) EVERY 6 HOURS PRN
Qty: 8.5 G | Refills: 0 | Status: SHIPPED | OUTPATIENT
Start: 2023-10-11

## 2023-10-11 RX ORDER — BENZONATATE 200 MG/1
200 CAPSULE ORAL 3 TIMES DAILY PRN
Qty: 30 CAPSULE | Refills: 0 | Status: SHIPPED | OUTPATIENT
Start: 2023-10-11

## 2023-10-11 NOTE — PATIENT INSTRUCTIONS
Most colds are caused by virus, which does not respond to antibiotics. Typically viruses are self limiting and will go away own their own. There are both over the counter medicines or prescriptions medicines that can be used to help with symptoms until the virus had a chance to run it's course. Take Tessalon as prescribed  Vitamin D3 2000 IU daily  Vitamin C 1000mg twice per day  Multivitamin daily  Some studies suggest that Zinc 12.5-15mg every 2 hours while awake x 5 days may shorten the duration cold symptoms by 1-2 days. Encourage fluids  Rest  Nasal saline spray  Over the counter decongestant; Afrin if severe congestion (do not use for more than 3 days)  Tylenol/Ibuprofen for pain/fever  Salt water gargles and chloraseptic spray  Tsp of honey  Warm tea with honey. May use lemon. Throat lozenges  Throat Coat Tea  Warm compresses over sinuses  Steam treatment (utilize proper safety precautions when in contact with hot water/steam)    Call PCP if symptoms not improving after 10-12 days, for persistent fever (more than 4-5 days total), concerns about breathing, persistent ear pain or signs of dehydration (decreased urine, dry, cracked lips).

## 2023-10-11 NOTE — PROGRESS NOTES
[facility]        NAME: Nathaniel Odom. is a 34 y.o. male  : 1990    MRN: [id number]  DATE: 2023  TIME: 12:35 PM    Assessment and Plan   Pharyngitis with viral syndrome [J02.9, B34.9]  1. Pharyngitis with viral syndrome  Poct Covid 19 Rapid Antigen Test    benzonatate (TESSALON) 200 MG capsule      2. History of asthma  albuterol (ProAir HFA) 90 mcg/act inhaler      3. Current smoker          POCT COVID 19 Rapid Antigen Test: Negative    Patient Instructions   Most colds are caused by virus, which does not respond to antibiotics. Typically viruses are self limiting and will go away own their own. There are both over the counter medicines or prescriptions medicines that can be used to help with symptoms until the virus had a chance to run it's course. Take Tessalon as prescribed  Vitamin D3 2000 IU daily  Vitamin C 1000mg twice per day  Multivitamin daily  Some studies suggest that Zinc 12.5-15mg every 2 hours while awake x 5 days may shorten the duration cold symptoms by 1-2 days. Encourage fluids  Rest  Nasal saline spray  Over the counter decongestant; Afrin if severe congestion (do not use for more than 3 days)  Tylenol/Ibuprofen for pain/fever  Salt water gargles and chloraseptic spray  Tsp of honey  Warm tea with honey. May use lemon. Throat lozenges  Throat Coat Tea  Warm compresses over sinuses  Steam treatment (utilize proper safety precautions when in contact with hot water/steam)    Call PCP if symptoms not improving after 10-12 days, for persistent fever (more than 4-5 days total), concerns about breathing, persistent ear pain or signs of dehydration (decreased urine, dry, cracked lips). Follow up with PCP in 3-5 days. Proceed to ER if symptoms worsen. Chief Complaint     Chief Complaint   Patient presents with   • Cold Like Symptoms     2 days, cough worse at night, yellow green mucus,      History of Present Illness       URI   This is a new problem.  The current episode started in the past 7 days (x2 days). The problem has been unchanged. Maximum temperature: Subjective. Associated symptoms include congestion, coughing ("When the sun goes down, the cough is worse at night". Pt requesting promethazine with codeine, states he puts a bottle in the frig and it helps him with his asthma and cough all winter long. Pt reports a productive cough-yellow/greenish this morning.), rhinorrhea and a sore throat (Feels like his throat is being squeezed, stated "you know when you first get sick"). Pertinent negatives include no chest pain, diarrhea, ear pain, headaches, nausea, sinus pain, vomiting or wheezing. Review of Systems   Review of Systems   Constitutional:  Positive for chills, diaphoresis and fever (Subjective). Negative for fatigue. HENT:  Positive for congestion, postnasal drip, rhinorrhea and sore throat (Feels like his throat is being squeezed, stated "you know when you first get sick"). Negative for ear discharge, ear pain, sinus pressure and sinus pain. Respiratory:  Positive for cough ("When the sun goes down, the cough is worse at night". Pt requesting promethazine with codeine, states he puts a bottle in the frig and it helps him with his asthma and cough all winter long. Pt reports a productive cough-yellow/greenish this morning. ). Negative for shortness of breath and wheezing. Cardiovascular: Negative. Negative for chest pain and palpitations. Gastrointestinal: Negative. Negative for diarrhea, nausea and vomiting. Musculoskeletal:  Positive for myalgias. Skin: Negative. Negative for color change. Neurological:  Negative for headaches.      Current Medications       Current Outpatient Medications:   •  albuterol (ProAir HFA) 90 mcg/act inhaler, Inhale 2 puffs every 6 (six) hours as needed for wheezing, Disp: 8.5 g, Rfl: 0  •  benzonatate (TESSALON) 200 MG capsule, Take 1 capsule (200 mg total) by mouth 3 (three) times a day as needed for cough, Disp: 30 capsule, Rfl: 0  •  promethazine-codeine (PHENERGAN WITH CODEINE) 6.25-10 mg/5 mL syrup, Take 5 mL by mouth every 4 (four) hours as needed for cough, Disp: 118 mL, Rfl: 0    Current Allergies     Allergies as of 10/11/2023 - Reviewed 10/11/2023   Allergen Reaction Noted   • Pollen extract  05/16/2019            The following portions of the patient's history were reviewed and updated as appropriate: allergies, current medications, past family history, past medical history, past social history, past surgical history and problem list.     Past Medical History:   Diagnosis Date   • ADHD (attention deficit hyperactivity disorder)    • Allergic    • Asthma    • Obesity        Past Surgical History:   Procedure Laterality Date   • DENTAL SURGERY         Family History   Problem Relation Age of Onset   • Mental illness Mother    • Depression Mother    • Depression Father    • Mental illness Father    • Alcohol abuse Paternal Uncle    • Alcohol abuse Paternal Grandmother    • No Known Problems Sister          Medications have been verified. Objective   /76   Pulse 99   Temp 98.2 °F (36.8 °C) (Temporal)   Resp 16   SpO2 99%        Physical Exam     Physical Exam  Vitals and nursing note reviewed. Constitutional:       General: He is not in acute distress. Appearance: Normal appearance. He is not ill-appearing or toxic-appearing. HENT:      Head: Normocephalic. Right Ear: Tympanic membrane, ear canal and external ear normal.      Left Ear: Tympanic membrane, ear canal and external ear normal.      Nose: Nose normal. No congestion or rhinorrhea. Mouth/Throat:      Mouth: Mucous membranes are moist.      Pharynx: No posterior oropharyngeal erythema. Cardiovascular:      Rate and Rhythm: Normal rate and regular rhythm. Pulses: Normal pulses. Heart sounds: No murmur heard. Pulmonary:      Effort: Pulmonary effort is normal. No respiratory distress.       Breath sounds: Normal breath sounds. No stridor. No wheezing, rhonchi or rales. Chest:      Chest wall: No tenderness. Musculoskeletal:         General: Normal range of motion. Cervical back: Normal range of motion and neck supple. Lymphadenopathy:      Cervical: No cervical adenopathy. Skin:     General: Skin is warm and moist.   Neurological:      Mental Status: He is alert.

## 2024-02-17 ENCOUNTER — OFFICE VISIT (OUTPATIENT)
Dept: URGENT CARE | Facility: CLINIC | Age: 34
End: 2024-02-17
Payer: COMMERCIAL

## 2024-02-17 VITALS
DIASTOLIC BLOOD PRESSURE: 87 MMHG | OXYGEN SATURATION: 99 % | TEMPERATURE: 98.5 F | HEART RATE: 92 BPM | SYSTOLIC BLOOD PRESSURE: 137 MMHG | RESPIRATION RATE: 20 BRPM

## 2024-02-17 DIAGNOSIS — J06.9 UPPER RESPIRATORY TRACT INFECTION, UNSPECIFIED TYPE: ICD-10-CM

## 2024-02-17 DIAGNOSIS — J02.9 SORE THROAT: Primary | ICD-10-CM

## 2024-02-17 LAB — S PYO AG THROAT QL: NEGATIVE

## 2024-02-17 PROCEDURE — 87880 STREP A ASSAY W/OPTIC: CPT | Performed by: NURSE PRACTITIONER

## 2024-02-17 PROCEDURE — 99213 OFFICE O/P EST LOW 20 MIN: CPT | Performed by: NURSE PRACTITIONER

## 2024-02-17 RX ORDER — ALBUTEROL SULFATE 90 UG/1
2 AEROSOL, METERED RESPIRATORY (INHALATION) EVERY 6 HOURS PRN
Qty: 8.5 G | Refills: 0 | Status: SHIPPED | OUTPATIENT
Start: 2024-02-17

## 2024-02-17 NOTE — PROGRESS NOTES
"  [facility]        NAME: Jhoan Bobo Jr. is a 33 y.o. male  : 1990    MRN: [id number]  DATE: 2024  TIME: 3:06 PM    Assessment and Plan   Sore throat [J02.9]  1. Sore throat  POCT rapid ANTIGEN strepA      2. Upper respiratory tract infection, unspecified type  albuterol (ProAir HFA) 90 mcg/act inhaler        Rapid strep in the office is negative.  Advised to continue to monitor for worsening symptoms if symptoms worsen he should follow-up with his primary care provider or return to urgent care.  Recommended over-the-counter medications for symptomatic relief.  Albuterol inhaler refill sent to pharmacy.    Patient Instructions       Follow up with PCP in 3-5 days.  Proceed to  ER if symptoms worsen.    Chief Complaint     Chief Complaint   Patient presents with    Sore Throat     Started with a throat problem. States if feels \"tight\" but does not hurt when he swallows, congestion, cough.          History of Present Illness       Patient is a 33-year-old male presenting with 2 days of sore throat.  He states that his son is currently being had for strep throat.  Yesterday he felt fatigued.  Denies fever or chills.  He does have increased postnasal drip.  No over-the-counter medications attempted.  He is also requesting a refill of his albuterol inhaler.    Sore Throat   Associated symptoms include congestion and coughing. Pertinent negatives include no headaches.       Review of Systems   Review of Systems   Constitutional:  Negative for activity change, chills and fever.   HENT:  Positive for congestion, postnasal drip and sore throat.    Respiratory:  Positive for cough.    Neurological:  Negative for headaches.         Current Medications       Current Outpatient Medications:     albuterol (ProAir HFA) 90 mcg/act inhaler, Inhale 2 puffs every 6 (six) hours as needed for wheezing, Disp: 8.5 g, Rfl: 0    albuterol (ProAir HFA) 90 mcg/act inhaler, Inhale 2 puffs every 6 (six) hours as " needed for wheezing, Disp: 8.5 g, Rfl: 0    benzonatate (TESSALON) 200 MG capsule, Take 1 capsule (200 mg total) by mouth 3 (three) times a day as needed for cough (Patient not taking: Reported on 2/17/2024), Disp: 30 capsule, Rfl: 0    promethazine-codeine (PHENERGAN WITH CODEINE) 6.25-10 mg/5 mL syrup, Take 5 mL by mouth every 4 (four) hours as needed for cough (Patient not taking: Reported on 2/17/2024), Disp: 118 mL, Rfl: 0    Current Allergies     Allergies as of 02/17/2024 - Reviewed 02/17/2024   Allergen Reaction Noted    Pollen extract  05/16/2019            The following portions of the patient's history were reviewed and updated as appropriate: allergies, current medications, past family history, past medical history, past social history, past surgical history and problem list.     Past Medical History:   Diagnosis Date    ADHD (attention deficit hyperactivity disorder)     Allergic     Asthma     Obesity        Past Surgical History:   Procedure Laterality Date    DENTAL SURGERY         Family History   Problem Relation Age of Onset    Mental illness Mother     Depression Mother     Depression Father     Mental illness Father     Alcohol abuse Paternal Uncle     Alcohol abuse Paternal Grandmother     No Known Problems Sister          Medications have been verified.        Objective   /87   Pulse 92   Temp 98.5 °F (36.9 °C) (Temporal)   Resp 20   SpO2 99%        Physical Exam     Physical Exam  Vitals reviewed.   Constitutional:       General: He is awake. He is not in acute distress.     Appearance: He is well-developed and normal weight.   HENT:      Head: Normocephalic.      Right Ear: Hearing, tympanic membrane, ear canal and external ear normal.      Left Ear: Hearing, tympanic membrane, ear canal and external ear normal.      Nose: Congestion present.      Mouth/Throat:      Lips: Pink.      Pharynx: Posterior oropharyngeal erythema present.      Comments: Significant amount of thick mucus  in the posterior pharynx  Cardiovascular:      Rate and Rhythm: Normal rate and regular rhythm.      Heart sounds: Normal heart sounds, S1 normal and S2 normal.   Pulmonary:      Effort: Pulmonary effort is normal.      Breath sounds: Normal breath sounds. No decreased breath sounds, wheezing or rhonchi.   Skin:     General: Skin is warm and moist.   Neurological:      General: No focal deficit present.      Mental Status: He is alert and oriented to person, place, and time.   Psychiatric:         Behavior: Behavior is cooperative.

## 2024-02-21 PROBLEM — H60.502 ACUTE OTITIS EXTERNA OF LEFT EAR: Status: RESOLVED | Noted: 2019-09-17 | Resolved: 2024-02-21
